# Patient Record
Sex: MALE | Race: OTHER
[De-identification: names, ages, dates, MRNs, and addresses within clinical notes are randomized per-mention and may not be internally consistent; named-entity substitution may affect disease eponyms.]

---

## 2020-03-29 ENCOUNTER — HOSPITAL ENCOUNTER (EMERGENCY)
Dept: HOSPITAL 74 - JER | Age: 55
Discharge: HOME | End: 2020-03-29
Payer: COMMERCIAL

## 2020-03-29 VITALS — DIASTOLIC BLOOD PRESSURE: 78 MMHG | HEART RATE: 79 BPM | SYSTOLIC BLOOD PRESSURE: 110 MMHG | TEMPERATURE: 98.1 F

## 2020-03-29 VITALS — BODY MASS INDEX: 28.1 KG/M2

## 2020-03-29 DIAGNOSIS — R55: Primary | ICD-10-CM

## 2020-03-29 LAB
ALBUMIN SERPL-MCNC: 3.6 G/DL (ref 3.4–5)
ALP SERPL-CCNC: 54 U/L (ref 45–117)
ALT SERPL-CCNC: 45 U/L (ref 13–61)
ANION GAP SERPL CALC-SCNC: 7 MMOL/L (ref 8–16)
AST SERPL-CCNC: 32 U/L (ref 15–37)
BILIRUB SERPL-MCNC: 0.4 MG/DL (ref 0.2–1)
BUN SERPL-MCNC: 15.1 MG/DL (ref 7–18)
CALCIUM SERPL-MCNC: 8.3 MG/DL (ref 8.5–10.1)
CHLORIDE SERPL-SCNC: 106 MMOL/L (ref 98–107)
CO2 SERPL-SCNC: 27 MMOL/L (ref 21–32)
CREAT SERPL-MCNC: 1.6 MG/DL (ref 0.55–1.3)
DEPRECATED RDW RBC AUTO: 13.3 % (ref 11.9–15.9)
GLUCOSE SERPL-MCNC: 169 MG/DL (ref 74–106)
HCT VFR BLD CALC: 45.4 % (ref 35.4–49)
HGB BLD-MCNC: 15.3 GM/DL (ref 11.7–16.9)
MCH RBC QN AUTO: 30.5 PG (ref 25.7–33.7)
MCHC RBC AUTO-ENTMCNC: 33.8 G/DL (ref 32–35.9)
MCV RBC: 90.3 FL (ref 80–96)
PLATELET # BLD AUTO: 157 K/MM3 (ref 134–434)
PMV BLD: 8.9 FL (ref 7.5–11.1)
POTASSIUM SERPLBLD-SCNC: 4.6 MMOL/L (ref 3.5–5.1)
PROT SERPL-MCNC: 7.2 G/DL (ref 6.4–8.2)
RBC # BLD AUTO: 5.02 M/MM3 (ref 4–5.6)
SODIUM SERPL-SCNC: 140 MMOL/L (ref 136–145)
WBC # BLD AUTO: 7.3 K/MM3 (ref 4–10)

## 2020-03-29 NOTE — PDOC
History of Present Illness





- General


Chief Complaint: Weakness


Stated Complaint: LOW BLOOD PRESSURE


Time Seen by Provider: 03/29/20 04:12


History Source: Patient


Exam Limitations: No Limitations





- History of Present Illness


Initial Comments: 


54-year-old male with no past medical history presented to the emergency 

department for a pre-syncopal episode occurring just prior to arrival. Patient 

reported he felt very lightheaded, became diaphoretic, called EMS. EMS reported 

that he was hypotensive in the field, given 1L of normal saline with improvement

of blood pressure back to normal. Patient reported a couple of days of a dry 

cough. Patient denied chest pain, shortness of breath,Palpitations, fever, 

Nausea, vomiting, diarrhea, abdominal pain, back pain. Patient denies illicit 

drug use, alcohol use.





ROS


General: denied fever, chills, generalized weakness.


HEENT: denied sore throat, rhinorrhea, ear pain.


Cardiovascular: admitted to presyncope, diaphoresis. denied chest pain, 

palpitations, syncope.


Respiratory: admitted to cough. denied shortness of breath, sputum production, 

hemoptysis.


Gastrointestinal: denied abdominal pain, nausea, vomiting, diarrhea, constipatio

n, blood in stool.


Genitourinary: denied dysuria, increased urinary frequency, hematuria, urinary 

incontinence, flank pain.


Back: denied back pain.


Musculoskeletal: denied joint pain, muscle pain, joint swelling.


Neurological: denied headache, dizziness, numbness, tingling, weakness. 


Integumentary: denied rash, laceration, abrasion.


Hematologic/Lymphatic: denied bruising or bleeding. 





PE


Constitutional: Well-nourished, Well-developed, appearing stated age.


HEENT: head is normocephalic, atraumatic. EOMI. PERRLA. 


Neck: supple. Full ROM.


Cardiovascular: regular heart rhythm. Normal S1 and S2. no murmurs. no 

pericardial friction rub. 


Respiratory: clear to auscultation bilaterally. no crackles, rhonchi or 

wheezing. no stridor.


Gastrointestinal: soft, flat, nontender. normal bowel sounds. no rebound, 

guarding, or masses. 


Extremities: peripheral pulses intact and equal. no lower extremity edema noted.




Neurological: CN 2-12 grossly intact. moves all four extremities. 


Psych: awake, alert, oriented x3. follows commands. answers questions 

appropriately. 








Past History





- Past Medical History


Allergies/Adverse Reactions: 


                                    Allergies











Allergy/AdvReac Type Severity Reaction Status Date / Time


 


No Known Allergies Allergy   Verified 03/29/20 04:10











Home Medications: 


Ambulatory Orders





Metformin HCl [Glucophage] 500 mg PO DAILY #30 tablet 03/29/20 











- Psycho Social/Smoking Cessation Hx


Smoking History: Never smoked


Have you smoked in the past 12 months: No


Information on smoking cessation initiated: No


Hx Alcohol Use: No


Drug/Substance Use Hx: No





*Physical Exam





- Vital Signs


                                Last Vital Signs











Temp Pulse Resp BP Pulse Ox


 


 98.4 F   80   20   129/83   100 


 


 03/29/20 04:11  03/29/20 04:11  03/29/20 04:11  03/29/20 04:11  03/29/20 04:11














ED Treatment Course





- LABORATORY


CBC & Chemistry Diagram: 


                                 03/29/20 04:42





                                 03/29/20 04:42





Medical Decision Making





- Medical Decision Making


54 year old male with above PMH presented to ED for evaluation of pre-syncopal 

episode occurring just PTA, associated with diaphoresis, also c/o 2-3 day of dry

 cough. 





                               Initial Vital Signs











Temp Pulse Resp BP Pulse Ox


 


 98.4 F   80   20   129/83   100 


 


 03/29/20 04:11  03/29/20 04:11  03/29/20 04:11  03/29/20 04:11  03/29/20 04:11








Afebrile. 


No tachycardia.


No tachypnea. 


No hypotension. 


No hypoxia on room air. 





Pt was given normal saline bolus 1000 cc en route via EMS. 


Pt reported resolution of symptoms. 





EKG performed at 0416: rate 74, regular rhythm, normal axis, normal intervals, 

no acute ST changes. 





Labs ordered: CBC, CMP, cardiac profile


Imaging ordered: CXR


Medications ordered: normal saline bolus 1000 cc once








03/29/20 05:40


CXR my read: no infiltrate. no cardiomegaly. no pulmonary vascular congestion. 

sharp costophrenic angles. 


-Pending official report





                             Laboratory Last Values











WBC  7.3 K/mm3 (4.0-10.0)   03/29/20  04:42    


 


RBC  5.02 M/mm3 (4.00-5.60)   03/29/20  04:42    


 


Hgb  15.3 GM/dL (11.7-16.9)   03/29/20  04:42    


 


Hct  45.4 % (35.4-49)   03/29/20  04:42    


 


MCV  90.3 fl (80-96)   03/29/20  04:42    


 


MCH  30.5 pg (25.7-33.7)   03/29/20  04:42    


 


MCHC  33.8 g/dl (32.0-35.9)   03/29/20  04:42    


 


RDW  13.3 % (11.9-15.9)   03/29/20  04:42    


 


Plt Count  157 K/MM3 (134-434)   03/29/20  04:42    


 


MPV  8.9 fl (7.5-11.1)   03/29/20  04:42    


 


Sodium  140 mmol/L (136-145)   03/29/20  04:42    


 


Potassium  4.6 mmol/L (3.5-5.1)   03/29/20  04:42    


 


Chloride  106 mmol/L ()   03/29/20  04:42    


 


Carbon Dioxide  27 mmol/L (21-32)   03/29/20  04:42    


 


Anion Gap  7 MMOL/L (8-16)  L  03/29/20  04:42    


 


BUN  15.1 mg/dL (7-18)   03/29/20  04:42    


 


Creatinine  1.6 mg/dL (0.55-1.3)  H  03/29/20  04:42    


 


Est GFR (CKD-EPI)AfAm  55.78   03/29/20  04:42    


 


Est GFR (CKD-EPI)NonAf  48.13   03/29/20  04:42    


 


Random Glucose  169 mg/dL ()  H  03/29/20  04:42    


 


Calcium  8.3 mg/dL (8.5-10.1)  L  03/29/20  04:42    


 


Total Bilirubin  0.4 mg/dL (0.2-1)   03/29/20  04:42    


 


AST  32 U/L (15-37)   03/29/20  04:42    


 


ALT  45 U/L (13-61)   03/29/20  04:42    


 


Alkaline Phosphatase  54 U/L ()   03/29/20  04:42    


 


Creatine Kinase  158 U/L ()   03/29/20  04:42    


 


Troponin I  < 0.02 ng/ml (0.00-0.05)   03/29/20  04:42    


 


Total Protein  7.2 g/dl (6.4-8.2)   03/29/20  04:42    


 


Albumin  3.6 g/dl (3.4-5.0)   03/29/20  04:42    


 


TSH  0.96 uIU/ml (0.358-3.74)   03/29/20  04:42    








No leukocytosis. 


No anemia. 


Mild BRET, consistent with volume depletion, pt given IV fluids. 


Troponin undetectable. 


TSH wnl. 





Results explained to patient. 


Pt to be discharged, advise to F/U with PCP, given referral for clinic system. 





Pt reported no recurrence of symptoms 





Discharge





- Discharge Information


Problems reviewed: Yes


Clinical Impression/Diagnosis: 


 Pre-syncope





Condition: Improved


Disposition: HOME





- Admission


No





- Additional Discharge Information


Prescriptions: 


Metformin HCl [Glucophage] 500 mg PO DAILY #30 tablet





- Follow up/Referral


Referrals: 


Amaury Husain MD [Staff Physician] - 


Paula Maria MD [Staff Physician] - 





- Patient Discharge Instructions


Patient Printed Discharge Instructions:  DI for Syncope in Adults (Fainting)


Additional Instructions: 


Follow up with your primary care doctor within 3 days regarding your ER visit. 

Your care is not complete until you follow up. Bring all paperwork given to you 

today to your appointment. I have provided you with a referral should you need 

one. 





Drink lots of fluids - gatorade/pedialyte - to prevent dehydration. 


Get 8 hours of sleep a night. 





Return to the ER for increasing pain, chest pain, shortness of breath, 

lightheadedness, intractable vomiting, fever, severe back or abdominal pain, or 

any other new, worsening or concerning symptoms. 





------------





Jessica un seguimiento con moreau mdico de atencin primaria dentro de los 3 mortensen con 

respecto a moreau visita de [facility]. Moreau atencin no est completa hasta que usted 

jessica un seguimiento. Lleve todo el papeleo que se le da hoy a moreau marisabel. Le he 

proporcionado pablo referencia en lawanda de que necesite rasheed.





Beban muchos lquidos - gatorada/pedialyte - para prevenir la deshidratacin.


Duerme 8 horas por noche.





Volver a urgencia para aumentar el dolor, dolor en el pecho, dificultad para 

respirar, aturdimiento, vmitos intratables, fiebre, dolor intenso de espalda o 

abdominal, o cualquier otro sntoma nuevo, que empeore o en relacin con los 

sntomas.








- Post Discharge Activity


Work/Back to School Note:  Back to Work

## 2020-03-29 NOTE — PDOC
Attending Attestation





- Resident


Resident Name: Isela Pizarro





- ED Attending Attestation


I have performed the following: I have examined & evaluated the patient, The 

case was reviewed & discussed with the resident, I agree w/resident's findings &

plan





- HPI


HPI: 





03/29/20 04:36


Pt comes for low blood pressure. He was slumped over a couch at home.  He has no

PMHx other than elevated glc, for which he exercises and does diet control.  He 

has no cough or cold or flu like sympmtoms.  He has not had the flu shot this 

year.


He has no covid-19 contacts


Pt lives with wife and 2 daughters


Pt has no complaints.


States that he went to the bathroom and felt lightheadedness.





- Physicial Exam


PE: 





03/29/20 04:36


Agree with resident exam





- Medical Decision Making





03/29/20 04:39


Pt doesn't have vasocvagal signs.


03/29/20 20:00


Pt looks great; CXR WNL; pt has poor control od DM; we will start glucophage 

lowest dose


Labs ok


Pt's vitals good.


He was hydrated and he is safe for d/c





Discharge





- Discharge Information


Problems reviewed: Yes


Clinical Impression/Diagnosis: 


 Pre-syncope





Condition: Improved


Disposition: HOME





- Additional Discharge Information


Prescriptions: 


Metformin HCl [Glucophage] 500 mg PO DAILY #30 tablet





- Follow up/Referral


Referrals: 


Amaury Husain MD [Staff Physician] - 


Paula Maria MD [Staff Physician] - 





- Patient Discharge Instructions


Patient Printed Discharge Instructions:  DI for Syncope in Adults (Fainting)


Additional Instructions: 


Follow up with your primary care doctor within 3 days regarding your ER visit. 

Your care is not complete until you follow up. Bring all paperwork given to you 

today to your appointment. I have provided you with a referral should you need 

one. 





Drink lots of fluids - gatorade/pedialyte - to prevent dehydration. 


Get 8 hours of sleep a night. 





Return to the ER for increasing pain, chest pain, shortness of breath, lighthead

edness, intractable vomiting, fever, severe back or abdominal pain, or any other

new, worsening or concerning symptoms. 





------------





Jessica un seguimiento con moreau mdico de atencin primaria dentro de los 3 mortensen con 

respecto a moreau visita de [facility]. Moreau atencin no est completa hasta que usted 

jessica un seguimiento. Lleve todo el papeleo que se le da hoy a moreau marisabel. Le he 

proporcionado pablo referencia en lawanda de que necesite rasheed.





Beban muchos lquidos - gatorada/pedialyte - para prevenir la deshidratacin.


Duerme 8 horas por noche.





Volver a urgencia para aumentar el dolor, dolor en el pecho, dificultad para 

respirar, aturdimiento, vmitos intratables, fiebre, dolor intenso de espalda o 

abdominal, o cualquier otro sntoma nuevo, que empeore o en relacin con los 

sntomas.








- Post Discharge Activity


Work/Back to School Note:  Back to Work

## 2020-03-30 NOTE — EKG
Test Reason : 

Blood Pressure : ***/*** mmHG

Vent. Rate : 074 BPM     Atrial Rate : 074 BPM

   P-R Int : 126 ms          QRS Dur : 080 ms

    QT Int : 384 ms       P-R-T Axes : 052 063 019 degrees

   QTc Int : 426 ms

 

NORMAL SINUS RHYTHM

POSSIBLE LEFT ATRIAL ENLARGEMENT

BORDERLINE ECG

NO PREVIOUS ECGS AVAILABLE

Confirmed by JORDYN PARRA MD ([id number]) on 3/30/2020 2:23:10 PM

 

Referred By:             Confirmed By:JORDYN PARRA MD

## 2022-10-28 ENCOUNTER — OFFICE (OUTPATIENT)
Dept: URBAN - METROPOLITAN AREA CLINIC 30 | Facility: CLINIC | Age: 57
Setting detail: OPHTHALMOLOGY
End: 2022-10-28
Payer: COMMERCIAL

## 2022-10-28 DIAGNOSIS — H40.003: ICD-10-CM

## 2022-10-28 DIAGNOSIS — E11.9: ICD-10-CM

## 2022-10-28 DIAGNOSIS — H25.093: ICD-10-CM

## 2022-10-28 PROCEDURE — 99204 OFFICE O/P NEW MOD 45 MIN: CPT | Performed by: OPHTHALMOLOGY

## 2022-10-28 ASSESSMENT — REFRACTION_MANIFEST
OS_VA1: 20/30
OS_CYLINDER: +1.50
OD_VA1: 20/25
OS_SPHERE: -0.25
OD_SPHERE: +0.25
OD_AXIS: 085
OD_CYLINDER: +0.75
OS_AXIS: 020

## 2022-10-28 ASSESSMENT — REFRACTION_CURRENTRX
OD_OVR_VA: 20/
OS_OVR_VA: 20/
OS_SPHERE: +1.25
OD_SPHERE: +1.25

## 2022-10-28 ASSESSMENT — REFRACTION_AUTOREFRACTION
OD_AXIS: 085
OS_SPHERE: -0.25
OS_CYLINDER: +1.50
OD_CYLINDER: +0.75
OD_SPHERE: +0.25
OS_AXIS: 020

## 2022-10-28 ASSESSMENT — SPHEQUIV_DERIVED
OD_SPHEQUIV: 0.625
OS_SPHEQUIV: 0.5
OD_SPHEQUIV: 0.625
OS_SPHEQUIV: 0.5

## 2022-10-28 ASSESSMENT — VISUAL ACUITY
OS_BCVA: 20/25
OD_BCVA: 20/40

## 2022-10-28 ASSESSMENT — CONFRONTATIONAL VISUAL FIELD TEST (CVF)
OS_FINDINGS: FULL
OD_FINDINGS: FULL

## 2022-10-28 ASSESSMENT — TONOMETRY
OS_IOP_MMHG: 15
OD_IOP_MMHG: 15

## 2023-12-04 ENCOUNTER — OFFICE (OUTPATIENT)
Dept: URBAN - METROPOLITAN AREA CLINIC 30 | Facility: CLINIC | Age: 58
Setting detail: OPHTHALMOLOGY
End: 2023-12-04
Payer: COMMERCIAL

## 2023-12-04 DIAGNOSIS — H25.13: ICD-10-CM

## 2023-12-04 DIAGNOSIS — H52.03: ICD-10-CM

## 2023-12-04 DIAGNOSIS — H40.003: ICD-10-CM

## 2023-12-04 DIAGNOSIS — E11.9: ICD-10-CM

## 2023-12-04 PROCEDURE — 92014 COMPRE OPH EXAM EST PT 1/>: CPT | Performed by: OPHTHALMOLOGY

## 2023-12-04 PROCEDURE — 92015 DETERMINE REFRACTIVE STATE: CPT | Performed by: OPHTHALMOLOGY

## 2023-12-04 ASSESSMENT — REFRACTION_MANIFEST
OS_VA1: 20/30
OD_CYLINDER: +0.25
OD_SPHERE: +0.75
OD_CYLINDER: +0.75
OU_VA: 20/25-1
OS_AXIS: 020
OS_CYLINDER: +1.50
OD_SPHERE: +0.25
OS_VA1: 20/40
OS_SPHERE: +1.50
OD_VA1: 20/25
OD_VA1: 20/25
OS_SPHERE: -0.25
OD_AXIS: 085
OD_AXIS: 080
OS_CYLINDER: +2.25
OS_AXIS: 010

## 2023-12-04 ASSESSMENT — SPHEQUIV_DERIVED
OD_SPHEQUIV: 0.875
OS_SPHEQUIV: 2.875
OS_SPHEQUIV: 2.625
OS_SPHEQUIV: 0.5
OD_SPHEQUIV: 0.875
OD_SPHEQUIV: 0.625

## 2023-12-04 ASSESSMENT — REFRACTION_AUTOREFRACTION
OS_AXIS: 010
OS_SPHERE: +1.50
OD_AXIS: 080
OD_SPHERE: +0.75
OS_CYLINDER: +2.75
OD_CYLINDER: +0.25

## 2023-12-04 ASSESSMENT — REFRACTION_CURRENTRX
OS_OVR_VA: 20/
OS_SPHERE: +1.25
OD_SPHERE: +1.25
OD_OVR_VA: 20/

## 2023-12-04 ASSESSMENT — CONFRONTATIONAL VISUAL FIELD TEST (CVF)
OD_FINDINGS: FULL
OS_FINDINGS: FULL

## 2024-05-03 ENCOUNTER — OFFICE (OUTPATIENT)
Dept: URBAN - METROPOLITAN AREA CLINIC 30 | Facility: CLINIC | Age: 59
Setting detail: OPHTHALMOLOGY
End: 2024-05-03
Payer: COMMERCIAL

## 2024-05-03 DIAGNOSIS — H25.11: ICD-10-CM

## 2024-05-03 DIAGNOSIS — H43.813: ICD-10-CM

## 2024-05-03 DIAGNOSIS — H40.003: ICD-10-CM

## 2024-05-03 DIAGNOSIS — E11.9: ICD-10-CM

## 2024-05-03 DIAGNOSIS — H25.13: ICD-10-CM

## 2024-05-03 PROCEDURE — 92025 CPTRIZED CORNEAL TOPOGRAPHY: CPT | Performed by: OPHTHALMOLOGY

## 2024-05-03 PROCEDURE — 92134 CPTRZ OPH DX IMG PST SGM RTA: CPT | Performed by: OPHTHALMOLOGY

## 2024-05-03 PROCEDURE — 92136 OPHTHALMIC BIOMETRY: CPT | Mod: RT | Performed by: OPHTHALMOLOGY

## 2024-05-03 PROCEDURE — 92014 COMPRE OPH EXAM EST PT 1/>: CPT | Performed by: OPHTHALMOLOGY

## 2024-05-03 PROCEDURE — 92083 EXTENDED VISUAL FIELD XM: CPT | Performed by: OPHTHALMOLOGY

## 2024-05-03 PROCEDURE — 92136 OPHTHALMIC BIOMETRY: CPT | Mod: TC | Performed by: OPHTHALMOLOGY

## 2024-05-03 ASSESSMENT — CONFRONTATIONAL VISUAL FIELD TEST (CVF)
OD_FINDINGS: FULL
OS_FINDINGS: FULL

## 2024-06-03 ENCOUNTER — OFFICE (OUTPATIENT)
Dept: URBAN - METROPOLITAN AREA CLINIC 30 | Facility: CLINIC | Age: 59
Setting detail: OPHTHALMOLOGY
End: 2024-06-03
Payer: COMMERCIAL

## 2024-06-03 ENCOUNTER — RX ONLY (RX ONLY)
Age: 59
End: 2024-06-03

## 2024-06-03 DIAGNOSIS — H40.003: ICD-10-CM

## 2024-06-03 DIAGNOSIS — H43.813: ICD-10-CM

## 2024-06-03 DIAGNOSIS — E11.9: ICD-10-CM

## 2024-06-03 DIAGNOSIS — H25.13: ICD-10-CM

## 2024-06-03 PROCEDURE — 92020 GONIOSCOPY: CPT | Performed by: OPHTHALMOLOGY

## 2024-06-03 PROCEDURE — 92012 INTRM OPH EXAM EST PATIENT: CPT | Performed by: OPHTHALMOLOGY

## 2024-06-03 RX ORDER — PREDNISOLN SP/MOXIFLOX/BROMFEN 1 %-0.5 %
DROPS OPHTHALMIC (EYE)
Qty: 8 | Refills: 1 | Status: ACTIVE | OUTPATIENT

## 2024-06-03 ASSESSMENT — CONFRONTATIONAL VISUAL FIELD TEST (CVF)
OD_FINDINGS: FULL
OS_FINDINGS: FULL

## 2024-06-26 ENCOUNTER — AMBULATORY SURGERY CENTER (OUTPATIENT)
Dept: URBAN - METROPOLITAN AREA SURGERY 15 | Facility: SURGERY | Age: 59
Setting detail: OPHTHALMOLOGY
End: 2024-06-26
Payer: COMMERCIAL

## 2024-06-26 DIAGNOSIS — H25.12: ICD-10-CM

## 2024-06-26 DIAGNOSIS — H21.562: ICD-10-CM

## 2024-06-26 PROCEDURE — 66982 XCAPSL CTRC RMVL CPLX WO ECP: CPT | Mod: LT | Performed by: OPHTHALMOLOGY

## 2024-06-27 ENCOUNTER — OFFICE (OUTPATIENT)
Dept: URBAN - METROPOLITAN AREA CLINIC 29 | Facility: CLINIC | Age: 59
Setting detail: OPHTHALMOLOGY
End: 2024-06-27
Payer: COMMERCIAL

## 2024-06-27 ENCOUNTER — RX ONLY (RX ONLY)
Age: 59
End: 2024-06-27

## 2024-06-27 DIAGNOSIS — Z96.1: ICD-10-CM

## 2024-06-27 PROCEDURE — 99024 POSTOP FOLLOW-UP VISIT: CPT | Performed by: OPHTHALMOLOGY

## 2024-06-27 ASSESSMENT — CONFRONTATIONAL VISUAL FIELD TEST (CVF)
OD_FINDINGS: FULL
OS_FINDINGS: FULL

## 2024-07-12 ENCOUNTER — OFFICE (OUTPATIENT)
Dept: URBAN - METROPOLITAN AREA CLINIC 30 | Facility: CLINIC | Age: 59
Setting detail: OPHTHALMOLOGY
End: 2024-07-12
Payer: COMMERCIAL

## 2024-07-12 DIAGNOSIS — Z96.1: ICD-10-CM

## 2024-07-12 PROCEDURE — 99024 POSTOP FOLLOW-UP VISIT: CPT | Performed by: OPHTHALMOLOGY

## 2024-07-12 ASSESSMENT — CONFRONTATIONAL VISUAL FIELD TEST (CVF)
OS_FINDINGS: FULL
OD_FINDINGS: FULL